# Patient Record
Sex: MALE | Employment: FULL TIME | URBAN - METROPOLITAN AREA
[De-identification: names, ages, dates, MRNs, and addresses within clinical notes are randomized per-mention and may not be internally consistent; named-entity substitution may affect disease eponyms.]

---

## 2024-10-21 ENCOUNTER — OFFICE VISIT (OUTPATIENT)
Dept: PULMONOLOGY | Age: 49
End: 2024-10-21
Payer: COMMERCIAL

## 2024-10-21 VITALS
BODY MASS INDEX: 22.12 KG/M2 | OXYGEN SATURATION: 98 % | SYSTOLIC BLOOD PRESSURE: 110 MMHG | TEMPERATURE: 98 F | RESPIRATION RATE: 20 BRPM | HEIGHT: 71 IN | DIASTOLIC BLOOD PRESSURE: 60 MMHG | HEART RATE: 82 BPM | WEIGHT: 158 LBS

## 2024-10-21 DIAGNOSIS — R06.00 DYSPNEA, UNSPECIFIED TYPE: ICD-10-CM

## 2024-10-21 DIAGNOSIS — R06.00 DYSPNEA, UNSPECIFIED TYPE: Primary | ICD-10-CM

## 2024-10-21 DIAGNOSIS — R05.9 COUGH, UNSPECIFIED TYPE: Primary | ICD-10-CM

## 2024-10-21 DIAGNOSIS — Z87.891 HISTORY OF TOBACCO USE: ICD-10-CM

## 2024-10-21 LAB
EXPIRATORY TIME: NORMAL
FEF 25-75% %PRED-PRE: NORMAL
FEF 25-75% PRED: NORMAL
FEF 25-75-PRE: NORMAL
FENO: 48 PPB
FEV1 %PRED-PRE: 92 %
FEV1 PRED: NORMAL
FEV1/FVC %PRED-PRE: NORMAL
FEV1/FVC PRED: NORMAL
FEV1/FVC: 81 %
FEV1: 3.76 L
FVC %PRED-PRE: 89 %
FVC PRED: NORMAL
FVC: 4.63 L
PEF %PRED-PRE: NORMAL
PEF PRED: NORMAL
PEF-PRE: NORMAL

## 2024-10-21 PROCEDURE — 99204 OFFICE O/P NEW MOD 45 MIN: CPT | Performed by: INTERNAL MEDICINE

## 2024-10-21 PROCEDURE — 95012 NITRIC OXIDE EXP GAS DETER: CPT | Performed by: INTERNAL MEDICINE

## 2024-10-21 PROCEDURE — 94010 BREATHING CAPACITY TEST: CPT | Performed by: INTERNAL MEDICINE

## 2024-10-21 RX ORDER — BUDESONIDE 0.5 MG/2ML
1 INHALANT ORAL 2 TIMES DAILY
COMMUNITY
Start: 2024-09-05

## 2024-10-21 RX ORDER — MULTIVIT WITH MINERALS/LUTEIN
250 TABLET ORAL DAILY
COMMUNITY

## 2024-10-21 RX ORDER — M-VIT,TX,IRON,MINS/CALC/FOLIC 27MG-0.4MG
1 TABLET ORAL DAILY
COMMUNITY

## 2024-10-21 RX ORDER — FLUTICASONE FUROATE 200 UG/1
1 POWDER RESPIRATORY (INHALATION) DAILY
Qty: 3 EACH | Refills: 3 | Status: SHIPPED | OUTPATIENT
Start: 2024-10-21

## 2024-10-21 RX ORDER — VITAMIN E 268 MG
400 CAPSULE ORAL DAILY
COMMUNITY

## 2024-10-21 ASSESSMENT — PULMONARY FUNCTION TESTS
FVC_PERCENT_PREDICTED_PRE: 89
FEV1: 3.76
FEV1_PERCENT_PREDICTED_PRE: 92
FEV1/FVC: 81
FENO: 48
FVC: 4.63

## 2024-10-21 NOTE — PROGRESS NOTES
Name:  Cisco Rome  YOB: 1975   MRN: [id number]      Office Visit: 10/22/2024       Assessment & Plan (Medical Decision Making)    Impression: 49 y.o. male for the evaluation of cough.    1. Cough, unspecified type  -I suspect the patient has developed cough variant asthma.  His FeNO is 48 which is quite significant and suggesting high likelihood of a eosinophilic asthma.  Patient was just recently started on Pulmicort I will try to see if I can put him on Arnuity inhaler.  Which would be much easier for him to use.  He needs an inhaler given recurrence and persistence of his symptoms.  He does have normal spirometry and normal CT scan.  He will need complete pulmonary function test    - Spirometry Without Bronchodilator  - AMB POC EXHALED NITRIC OXIDE    2. History of tobacco use  -1-pack-year history of smoking quit in 2002    3. Dyspnea, unspecified type      Orders Placed This Encounter   Medications    fluticasone (ARNUITY ELLIPTA) 200 MCG/ACT AEPB     Sig: Inhale 1 puff into the lungs daily     Dispense:  3 each     Refill:  3     No orders of the defined types were placed in this encounter.    Follow-up and Dispositions    Return in about 4 months (around 2/21/2025) for with complete PFT.       Tiny Monsivais MD    No specialty comments available.  No problem-specific Assessment & Plan notes found for this encounter.        Total time for encounter on day of encounter was 50 minutes.  This time includes chart prep, review of tests/procedures, review of other provider's notes, documentation and counseling patient regarding disease process and medications.   _________________________________________________________________________    HISTORY OF PRESENT ILLNESS:    Mr. Cisco Rome is a 49 y.o. male who is seen at [facility] for further evaluation of cough and shortness of breath.  Patient was referred by Dr. Wright.  Patient reports that in 2005 he was in the [employer] and he was

## 2025-01-22 ENCOUNTER — TELEPHONE (OUTPATIENT)
Dept: PULMONOLOGY | Age: 50
End: 2025-01-22

## 2025-01-22 NOTE — TELEPHONE ENCOUNTER
Patient has no insurance . So he went to  the   fluticasone (ARNUITY ELLIPTA) 200 MCG/ACT AEPB     It was $300. Dollars . He cannot afford it . He is asking if there is something more affordable that can be prescribed

## 2025-01-23 NOTE — TELEPHONE ENCOUNTER
Returned pts call. Patient states that he does have insurance but it does not cover Arnuity. I told him to call the insurance to see what's on the preferred drug list and to call us back and we can change RX. FREDA CLOUD MA

## 2025-02-13 ENCOUNTER — NURSE ONLY (OUTPATIENT)
Dept: PULMONOLOGY | Age: 50
End: 2025-02-13

## 2025-02-13 DIAGNOSIS — R05.9 COUGH, UNSPECIFIED TYPE: Primary | ICD-10-CM

## 2025-02-13 LAB
FEV 1 , POC: 3.69 L
FEV1 % PRED, POC: 91 %
FEV1/FVC, POC: NORMAL
FVC % PRED, POC: 94 %
FVC, POC: NORMAL

## 2025-02-13 ASSESSMENT — PULMONARY FUNCTION TESTS
FVC_PERCENT_PREDICTED_POC: 94
FEV1_PERCENT_PREDICTED_POC: 91

## 2025-02-13 NOTE — PROGRESS NOTES
CPFT complete.  After receiving 3 puffs albuterol MDI, pt stated he became jittery with heart palpitations.  HR  83-87, SpO2 97-99%, RR 20.  He stated that he felt uncomfortable, and would prefer not to take albuterol again.  Will add to allergy list.

## 2025-02-28 ENCOUNTER — OFFICE VISIT (OUTPATIENT)
Dept: PULMONOLOGY | Age: 50
End: 2025-02-28
Payer: COMMERCIAL

## 2025-02-28 VITALS
HEIGHT: 71 IN | SYSTOLIC BLOOD PRESSURE: 119 MMHG | RESPIRATION RATE: 14 BRPM | HEART RATE: 68 BPM | BODY MASS INDEX: 22.68 KG/M2 | DIASTOLIC BLOOD PRESSURE: 76 MMHG | OXYGEN SATURATION: 98 % | WEIGHT: 162 LBS

## 2025-02-28 DIAGNOSIS — J45.991 COUGH VARIANT ASTHMA: Primary | ICD-10-CM

## 2025-02-28 PROCEDURE — 99214 OFFICE O/P EST MOD 30 MIN: CPT | Performed by: INTERNAL MEDICINE

## 2025-02-28 RX ORDER — FLUTICASONE FUROATE 100 UG/1
1 POWDER RESPIRATORY (INHALATION) DAILY
Qty: 1 EACH | Refills: 5 | Status: SHIPPED | OUTPATIENT
Start: 2025-02-28

## 2025-02-28 NOTE — PROGRESS NOTES
Name:  Cisco Rome  YOB: 1975   MRN: [id number]      Office Visit: 2/28/2025       Assessment & Plan (Medical Decision Making)    Impression: 49 y.o. male for the evaluation of cough.    1. Cough, unspecified type  -Patient has developed cough variant asthma.  His FeNO is 48 which is quite significant and suggesting high likelihood of a eosinophilic asthma.  He had complete pulmonary function test which showed significant response to the bronchodilator however he also tells me after the giving albuterol he had such a severe reaction that he could not drive for another 45 minutes with a lot of shaking tachycardia and we really filled that for a long time and would not want to take albuterol again   -He could not use Arnuity because it was expensive therefore he really did not take any inhalers he forgot about Pulmicort nebulizer he does have a new insurance therefore I given again inhaler for Arnuity which may be covered better now and also Qvar if he has troubles he will need to call his insurance to find out which is the easiest inhaler to be covered rather than nebulizer which may be quite inconvenient for a young person who is busy to be on nebulizer      - Spirometry Without Bronchodilator  - AMB POC EXHALED NITRIC OXIDE    2. History of tobacco use  -1-pack-year history of smoking quit in 2002    3. Dyspnea, unspecified type      Orders Placed This Encounter   Medications    beclomethasone (QVAR REDIHALER) 80 MCG/ACT AERB inhaler     Sig: Inhale 1 puff into the lungs in the morning and 1 puff in the evening.     Dispense:  1 each     Refill:  5    fluticasone (ARNUITY ELLIPTA) 100 MCG/ACT AEPB     Sig: Inhale 1 puff into the lungs daily     Dispense:  1 each     Refill:  5     No orders of the defined types were placed in this encounter.    Follow-up and Dispositions    Return in about 3 months (around 5/28/2025) for With Loi.         Tiny Monsivais MD    No specialty comments

## 2025-03-06 DIAGNOSIS — R06.83 SNORING: Primary | ICD-10-CM

## 2025-04-08 ENCOUNTER — HOSPITAL ENCOUNTER (OUTPATIENT)
Dept: SLEEP CENTER | Age: 50
Discharge: HOME | End: 2025-04-10
Payer: COMMERCIAL

## 2025-04-08 PROCEDURE — 95806 SLEEP STUDY UNATT&RESP EFFT: CPT

## 2025-05-23 ENCOUNTER — OFFICE VISIT (OUTPATIENT)
Dept: PULMONOLOGY | Age: 50
End: 2025-05-23

## 2025-05-23 VITALS
DIASTOLIC BLOOD PRESSURE: 69 MMHG | WEIGHT: 163 LBS | BODY MASS INDEX: 22.82 KG/M2 | SYSTOLIC BLOOD PRESSURE: 109 MMHG | RESPIRATION RATE: 16 BRPM | OXYGEN SATURATION: 98 % | TEMPERATURE: 97.2 F | HEIGHT: 71 IN | HEART RATE: 63 BPM

## 2025-05-23 DIAGNOSIS — J30.9 ALLERGIC RHINITIS, UNSPECIFIED SEASONALITY, UNSPECIFIED TRIGGER: ICD-10-CM

## 2025-05-23 DIAGNOSIS — Z87.891 HISTORY OF TOBACCO ABUSE: ICD-10-CM

## 2025-05-23 DIAGNOSIS — J45.909 UNCOMPLICATED ASTHMA, UNSPECIFIED ASTHMA SEVERITY, UNSPECIFIED WHETHER PERSISTENT: Primary | ICD-10-CM

## 2025-05-23 DIAGNOSIS — R06.83 SNORING: ICD-10-CM

## 2025-05-23 NOTE — PROGRESS NOTES
Name:  Cisco Rome  YOB: 1975   MRN: [id number]      Office Visit: 5/23/2025       Assessment & Plan (Medical Decision Making)    Impression: 49 y.o. male who is here for follow-up of asthma.    1. Uncomplicated asthma, unspecified asthma severity, unspecified whether persistent  -Patient does have cough variant asthma his FeNO was 48 and he did respond well to Arnuity.  - He continues to use Arnuity he does not use albuterol.  He had 1 reaction when he had significant tachycardia and he does not want to use it    2. Snoring  -He had sleep study done in April unfortunately still has not been read I went to sleep clinic to make sure that it will be expedited read and he will hear from our sleep clinic soon    3. Allergic rhinitis, unspecified seasonality, unspecified trigger  - He does still have occasional cough from postnasal drainage she is on Xyzal however he does use it as needed    4. History of tobacco abuse  -Patient has smoked 1 pack a day/9 years and he quit in 2002    No orders of the defined types were placed in this encounter.    No orders of the defined types were placed in this encounter.  Follow-up and Dispositions    Return in about 6 months (around 11/23/2025).       Tiny Monsivais MD    No specialty comments available.  No problem-specific Assessment & Plan notes found for this encounter.            Total time for encounter on day of encounter was 30 minutes.  This time includes chart prep, review of tests/procedures, review of other provider's notes, documentation and counseling patient regarding disease process and medications.   _________________________________________________________________________    HISTORY OF PRESENT ILLNESS:    Mr. Cisco Rome is a 49 y.o. male who is seen at [facility] for  No chief complaint on file.       Initial visit 10/2024Patient reports that in 2005 he was in the [employer] and he was stationed in [place] and he was having problems with

## 2025-05-27 ENCOUNTER — TELEPHONE (OUTPATIENT)
Dept: SLEEP MEDICINE | Age: 50
End: 2025-05-27

## 2025-05-27 DIAGNOSIS — G47.33 OSA (OBSTRUCTIVE SLEEP APNEA): Primary | ICD-10-CM

## 2025-05-27 NOTE — TELEPHONE ENCOUNTER
Patient had recent sleep study showing mild sleep apnea. Cpap therapy is recommended per sleep interp.  Patient has agreed to start CPAP therapy. Order sent to [facility].

## 2025-08-14 ENCOUNTER — TELEPHONE (OUTPATIENT)
Dept: PULMONOLOGY | Age: 50
End: 2025-08-14